# Patient Record
Sex: MALE | Race: WHITE | NOT HISPANIC OR LATINO | Employment: FULL TIME | URBAN - METROPOLITAN AREA
[De-identification: names, ages, dates, MRNs, and addresses within clinical notes are randomized per-mention and may not be internally consistent; named-entity substitution may affect disease eponyms.]

---

## 2017-01-06 ENCOUNTER — CLINICAL SUPPORT (OUTPATIENT)
Dept: INTERNAL MEDICINE | Facility: CLINIC | Age: 42
End: 2017-01-06

## 2017-01-06 ENCOUNTER — HOSPITAL ENCOUNTER (OUTPATIENT)
Dept: CARDIOLOGY | Facility: CLINIC | Age: 42
Discharge: HOME | End: 2017-01-06
Payer: COMMERCIAL

## 2017-01-06 ENCOUNTER — HOSPITAL ENCOUNTER (OUTPATIENT)
Dept: RADIOLOGY | Facility: HOSPITAL | Age: 42
Discharge: HOME | End: 2017-01-06
Attending: INTERNAL MEDICINE
Payer: COMMERCIAL

## 2017-01-06 ENCOUNTER — CLINICAL SUPPORT (OUTPATIENT)
Dept: INTERNAL MEDICINE | Facility: CLINIC | Age: 42
End: 2017-01-06
Payer: COMMERCIAL

## 2017-01-06 ENCOUNTER — OFFICE VISIT (OUTPATIENT)
Dept: INTERNAL MEDICINE | Facility: CLINIC | Age: 42
End: 2017-01-06
Payer: COMMERCIAL

## 2017-01-06 DIAGNOSIS — Z00.00 ROUTINE GENERAL MEDICAL EXAMINATION AT A HEALTH CARE FACILITY: Primary | ICD-10-CM

## 2017-01-06 DIAGNOSIS — Z00.00 ROUTINE GENERAL MEDICAL EXAMINATION AT A HEALTH CARE FACILITY: ICD-10-CM

## 2017-01-06 LAB
ALBUMIN SERPL BCP-MCNC: 3.9 G/DL
ALP SERPL-CCNC: 60 U/L
ALT SERPL W/O P-5'-P-CCNC: 21 U/L
ANION GAP SERPL CALC-SCNC: 9 MMOL/L
AST SERPL-CCNC: 20 U/L
BILIRUB SERPL-MCNC: 0.5 MG/DL
BUN SERPL-MCNC: 16 MG/DL
CALCIUM SERPL-MCNC: 8.9 MG/DL
CHLORIDE SERPL-SCNC: 108 MMOL/L
CHOLEST/HDLC SERPL: 4.2 {RATIO}
CO2 SERPL-SCNC: 24 MMOL/L
COMPLEXED PSA SERPL-MCNC: 1.4 NG/ML
CREAT SERPL-MCNC: 1.1 MG/DL
DIASTOLIC DYSFUNCTION: NO
ERYTHROCYTE [DISTWIDTH] IN BLOOD BY AUTOMATED COUNT: 12.9 %
EST. GFR  (AFRICAN AMERICAN): >60 ML/MIN/1.73 M^2
EST. GFR  (NON AFRICAN AMERICAN): >60 ML/MIN/1.73 M^2
ESTIMATED AVG GLUCOSE: 97 MG/DL
GLUCOSE SERPL-MCNC: 84 MG/DL
HBA1C MFR BLD HPLC: 5 %
HCT VFR BLD AUTO: 42.7 %
HDL/CHOLESTEROL RATIO: 23.9 %
HDLC SERPL-MCNC: 213 MG/DL
HDLC SERPL-MCNC: 51 MG/DL
HGB BLD-MCNC: 14.5 G/DL
LDLC SERPL CALC-MCNC: 136.6 MG/DL
MCH RBC QN AUTO: 30 PG
MCHC RBC AUTO-ENTMCNC: 34 %
MCV RBC AUTO: 88 FL
NONHDLC SERPL-MCNC: 162 MG/DL
PLATELET # BLD AUTO: 285 K/UL
PMV BLD AUTO: 9.3 FL
POTASSIUM SERPL-SCNC: 4.1 MMOL/L
PROT SERPL-MCNC: 7 G/DL
RBC # BLD AUTO: 4.84 M/UL
SODIUM SERPL-SCNC: 141 MMOL/L
TRIGL SERPL-MCNC: 127 MG/DL
TSH SERPL DL<=0.005 MIU/L-ACNC: 1.42 UIU/ML
WBC # BLD AUTO: 8.14 K/UL

## 2017-01-06 PROCEDURE — 84443 ASSAY THYROID STIM HORMONE: CPT

## 2017-01-06 PROCEDURE — 80053 COMPREHEN METABOLIC PANEL: CPT

## 2017-01-06 PROCEDURE — 84153 ASSAY OF PSA TOTAL: CPT

## 2017-01-06 PROCEDURE — 80061 LIPID PANEL: CPT

## 2017-01-06 PROCEDURE — 71020 XR CHEST PA AND LATERAL: CPT | Mod: 26,,, | Performed by: RADIOLOGY

## 2017-01-06 PROCEDURE — 99999 PR PBB SHADOW E&M-EST. PATIENT-LVL III: CPT | Mod: PBBFAC,,, | Performed by: INTERNAL MEDICINE

## 2017-01-06 PROCEDURE — 71020 XR CHEST PA AND LATERAL: CPT | Mod: TC

## 2017-01-06 PROCEDURE — 83036 HEMOGLOBIN GLYCOSYLATED A1C: CPT

## 2017-01-06 PROCEDURE — 99386 PREV VISIT NEW AGE 40-64: CPT | Mod: ,,, | Performed by: INTERNAL MEDICINE

## 2017-01-06 PROCEDURE — 85027 COMPLETE CBC AUTOMATED: CPT

## 2017-01-06 PROCEDURE — 93015 CV STRESS TEST SUPVJ I&R: CPT | Mod: ,,, | Performed by: INTERNAL MEDICINE

## 2017-01-06 PROCEDURE — 97802 MEDICAL NUTRITION INDIV IN: CPT | Mod: S$GLB,,, | Performed by: INTERNAL MEDICINE

## 2017-01-06 PROCEDURE — 86703 HIV-1/HIV-2 1 RESULT ANTBDY: CPT

## 2017-01-06 PROCEDURE — 97750 PHYSICAL PERFORMANCE TEST: CPT | Mod: S$GLB,,, | Performed by: INTERNAL MEDICINE

## 2017-01-06 NOTE — PROGRESS NOTES
"Nutrition Assessment  Client name:  Tor Edgar  :  1975  Age:  41 y.o.  Gender:  male    Client states:  Very pleasant [employer] employee here for his initial Executive Health physical.  Has a history of bipolar disorder and ADD, for which he takes prescription medications daily.  Also, follows up with a psychiatrist at [facility].  Has two young sons and a family history of heart disease.  Notes that his father passed away at age 65 due to his third MI.  As a result of such, he wishes to remain proactive in managing his health, specifically his heart health.  Adhered to the ketosis diet in , during which time he eliminated his intake of carbohydrates while eating mostly "fatty" meats and vegetables.  Lost >20# with his lowest weight averaging 180#.  Adds that his weight has now stabilized at ~190#.  No longer adheres to the ketosis diet as strictly and has reincorporated moderate amounts of carbohydrates.  Nevertheless, continues to select full fat dairy products, including whole milk, full fat yogurt, and full fat cheese, in addition to a variety of lean and "fatty" meats.  Enjoys sweets and desserts although tries to limit intake, replacing with healthier alternatives, such as cottage cheese and fruit.  Also, maintains an active lifestyle, alternating between jogging, biking, and strength training.  Is anxious to see results of lipid panel this morning as his cholesterol recently yakelin to 230 according to work site wellness screening he participated in this past August.               Past Medical History   Diagnosis Date    Bipolar 1 disorder        Social History    Marital status:    Employment:  [employer]  Social History   Substance Use Topics    Smoking status: Never Smoker    Smokeless tobacco: Not on file    Alcohol use Yes      Comment: 3-4 times a month        Current medications:  has a current medication list which includes the following prescription(s): bupropion, bupropion, " "cyclobenzaprine, lamotrigine, naproxen, and tadalafil.  Vitamins, minerals, and/or supplements:  MVI + Advocare amino acid supplements   Food allergies or intolerances:  NKFA     Food History  Breakfast:  Creamy swiss Laughing Cow cheese + eggs /eggs + muffin + ibarra or sausage/omelet with green peppers and cheese  Lunch:  6 inch Subway cold cut, chicken breast, or steak and cheese sandwich on wheat   Mid-afternoon Snack:  Ritz peanut butter crackers  Dinner:  Bowtie pasta + shrimp + mixed vegetables/chicken + mushroom sauce + carrots + brown and bipin rice   H.S. Snack:  Halo ice cream/almonds, walnuts, pecans/raspberries + cottage cheese    Exercise History:  20-30 minutes jogging/biking 2-3x/week + strength training/weight lifting 1-2x/week      Lab Reports   Total Cholesterol:  213    Triglycerides:  127  HDL:  51  LDL:  136.6   Glucose:  84  HbA1c:  pending  BP:  140/84     Weight History  Height:  6' 1"     Weight:  189#  BMI:  24.9  % Body Fat:  18.59%    Diagnosis  RMR (Method:  Body Garfield):  2190 kcal  Activity Factor:  1.3  KATHRINE:  2847 - 250 = 2597 kcal    Altered nutrition-related laboratory values related to excessive saturated fat intake as evidenced by TC:  213; LDL:  136.6; BP:  140/84.    Intervention    Goals:  1.  TC < 200; LDL < 130; BP < 120/80  2.  Select mostly lean meats, including chicken, turkey, fish, roast, loin and round cuts of pork and beef, beans, etc.  3.  Switch to low fat or non fat dairy products, including 1% milk, low fat or non fat yogurt, low fat cheese, etc.  4.  Maintain physical activity of 150 minutes/week or more as tolerated    Labs were not ready at time of consult yet reviewed previous results provided by patient.  Discussed heart healthy eating, including the different types of fats and food sources of each, the importance of limiting saturated fat intake, and the current AHA recommendations regarding lean meats and low fat dairy products.  Advised patient to select " mostly lean meats and low fat dairy products so as to minimize saturated fat intake.  Also, discussed the roles of each macronutrient, specifically in relation to LBM preservation, protein sparing, and fuel maintenance throughout exercise.      Handouts provided:  Meal Planning Guide  Restaurant Guide  Eat Fit Shopping List  Eat Fit Su  Fast Food Guide  Vitamin/Mineral Guide    Monitoring/Evaluation    Monitor the following:  Weight  BMI  Saturated fat intake  Lipid panel  Blood pressure    Follow Up Plan:  Follow up with client in 1-2 years

## 2017-01-06 NOTE — PROGRESS NOTES
Subjective:       Patient ID: Tor Edgar is a 41 y.o. male.    Chief Complaint: No chief complaint on file.    HPI   Mr. Edgar has reported no previous history of cardiovascular or pulmonary disease. His father did die young of a heart attack. He has reported no physical limitations to exercise. Mr. Edgar currently performs vigorous aerobic and resistance training about 2 days per week and moderate intensity 3 days. He has a goal of reaching 16% body fat by next year.     Review of Systems    Objective:      The fitness evaluation results are as follows:    D.O.S. 1/6/2017   Height (in): 73   Weight (lbs): 189   BMI: 24.34585   Body Fat (%): 18.59   Waist (cm): 86   Hip (cm): 97   WHR: 0.89   RBP (mmHg): 122/80   RHR (bpm): 52    Strength R (lbs)t: 116    Strength Lt (lbs): 98.73909   Push-up Assessment: 32   Curl-up Assessment: 39   Flexibility Testing (cm): 28   REE (kcals): 2190       Physical Exam    Assessment:      Age/Gender Stratified Assessment:     Heart Rate: Normal   Resting BP: Normal   Body Fat %: Very Good   WHR Risk Factor: Low Risk    Strength R: Average    Strength L: Average   Upper Body Endurance: Excellent   Abdominal Endurance: Average   Lower body Flexibility: Good        1. Routine general medical examination at a health care facility        Plan:    Mr. Edgar should continue with his regular aerobic activity striving for a goal of 75 minutes of vigorous intensity aerobic exercise each week. He should also continue with his resistance training to maintain muscular strength and endurance. Daily stretching should also be performed to maintain level of flexibility. Mr. Edgar should focus on increasing resistance training over the next year so that his strength does not decrease. It was explained that increased aerobic intensity and duration will help decrease fat percentage.

## 2017-01-09 LAB — HIV 1+2 AB+HIV1 P24 AG SERPL QL IA: NEGATIVE

## 2017-01-10 VITALS
HEART RATE: 70 BPM | BODY MASS INDEX: 24.88 KG/M2 | DIASTOLIC BLOOD PRESSURE: 84 MMHG | SYSTOLIC BLOOD PRESSURE: 122 MMHG | TEMPERATURE: 98 F | WEIGHT: 193.81 LBS

## 2017-01-10 NOTE — PROGRESS NOTES
Subjective:       Patient ID: Tor Edgar is a 41 y.o. male.    Chief Complaint: Executive Health    HPIPleasant gentleman from [place], [employer]  here for his Executive Health exam. Overall doing well and had no specific complaints.WE briefly discussed the importance of proper nutrition, exercise, weight management vis a vis future health issues.  I am sending copies of his studies including blood work that showed mild increase in cholesterol level at 213 with acceptable HDL fraction. Stress test showed less than 1 mm depression in the inferolateral leads at peak exercise, but not diagnostic of ischemia. He demonstrated excellent exercise capacity and was asymptomatic at peak activity. All other parameters were within normal limits including renal/liver/thyroid function, chest xray, EKG were unremarkable as well.  Review of Systems   Constitutional: Negative for activity change, appetite change, fatigue and unexpected weight change.   Eyes: Negative for visual disturbance.   Respiratory: Negative for cough and shortness of breath.    Cardiovascular: Negative for chest pain, palpitations and leg swelling.   Endocrine: Negative.    Genitourinary: Negative for difficulty urinating.   Musculoskeletal: Negative for arthralgias, back pain and joint swelling.   Neurological: Negative for dizziness, light-headedness and headaches.   Hematological: Negative.        Objective:      Physical Exam   Constitutional: He is oriented to person, place, and time. He appears well-developed and well-nourished. No distress.   HENT:   Head: Normocephalic and atraumatic.   Right Ear: External ear normal.   Left Ear: External ear normal.   Mouth/Throat: Oropharynx is clear and moist. No oropharyngeal exudate.   Eyes: Conjunctivae and EOM are normal. Pupils are equal, round, and reactive to light. Right eye exhibits no discharge. Left eye exhibits no discharge. No scleral icterus.   Neck: Normal range of motion.  Neck supple. No JVD present. No thyromegaly present.   Cardiovascular: Normal rate, regular rhythm, normal heart sounds and intact distal pulses.    No murmur heard.  Pulmonary/Chest: Effort normal and breath sounds normal. No respiratory distress. He has no wheezes. He exhibits no tenderness.   Abdominal: Soft. Bowel sounds are normal. He exhibits no distension and no mass. There is no tenderness.   Musculoskeletal: Normal range of motion. He exhibits no edema or tenderness.   Neurological: He is alert and oriented to person, place, and time. No cranial nerve deficit.   Skin: Skin is warm and dry. He is not diaphoretic.   Psychiatric: He has a normal mood and affect. His behavior is normal. Judgment and thought content normal.   Nursing note and vitals reviewed.      Assessment:       1. Routine general medical examination at a health care facility        Plan:    1. Return to clinic in 1 year or sooner if needed.

## 2017-01-18 ENCOUNTER — OFFICE VISIT (OUTPATIENT)
Dept: PSYCHIATRY | Facility: CLINIC | Age: 42
End: 2017-01-18
Payer: COMMERCIAL

## 2017-01-18 VITALS
SYSTOLIC BLOOD PRESSURE: 128 MMHG | DIASTOLIC BLOOD PRESSURE: 82 MMHG | HEART RATE: 61 BPM | WEIGHT: 190 LBS | BODY MASS INDEX: 24.38 KG/M2 | HEIGHT: 74 IN

## 2017-01-18 DIAGNOSIS — F31.81 BIPOLAR 2 DISORDER: Primary | ICD-10-CM

## 2017-01-18 PROCEDURE — 1159F MED LIST DOCD IN RCRD: CPT | Mod: S$GLB,,, | Performed by: PSYCHIATRY & NEUROLOGY

## 2017-01-18 PROCEDURE — 99214 OFFICE O/P EST MOD 30 MIN: CPT | Mod: S$GLB,,, | Performed by: PSYCHIATRY & NEUROLOGY

## 2017-01-18 PROCEDURE — 99999 PR PBB SHADOW E&M-EST. PATIENT-LVL II: CPT | Mod: PBBFAC,,, | Performed by: PSYCHIATRY & NEUROLOGY

## 2017-01-18 RX ORDER — LAMOTRIGINE 200 MG/1
200 TABLET ORAL DAILY
Qty: 30 TABLET | Refills: 1 | Status: SHIPPED | OUTPATIENT
Start: 2017-01-18 | End: 2017-02-23 | Stop reason: SDUPTHER

## 2017-01-18 RX ORDER — BUPROPION HYDROCHLORIDE 150 MG/1
TABLET ORAL
Qty: 30 TABLET | Refills: 1 | Status: SHIPPED | OUTPATIENT
Start: 2017-01-18 | End: 2017-02-23 | Stop reason: SDUPTHER

## 2017-01-18 RX ORDER — BUPROPION HYDROCHLORIDE 300 MG/1
TABLET ORAL
Qty: 30 TABLET | Refills: 1 | Status: SHIPPED | OUTPATIENT
Start: 2017-01-18 | End: 2017-02-23 | Stop reason: SDUPTHER

## 2017-01-18 NOTE — PROGRESS NOTES
1/18/2017 8:39 AM   Tor Edgar   1975   [id number]       OUTPATIENT PSYCHIATRY FOLLOW UP NOTE      Clinical Status of Patient:  Outpatient (Ambulatory)    Chief Complaint:  Tor Edgar is a 41 y.o. male who presents today for follow-up of Bipolar II d/o & ADHD.  Met with patient and I have reviewed the patient's medical history in detail and updated the computerized patient record.    Interval History and Content of Current Session:  Interim Events/Subjective Report/Content of Current Session:   At last visit 6 months ago, Dr. Lucero continued dose of wellbutrin to 450mg to target sx of ADHD and Pt continued on Lamictal 200mg daily.     Pt states he has been having stable moods overall.  He hasn't had any manic episodes but has had one day that he felt really down. Pt sleeping about 5.5 hrs during the week and 7 hrs on the weekend.  States he has a lot of thoughts (not worries) prior to bed and needs to read to calm his mind.  Admits to feeling restless.  Pt feels like he needs to keep doing things and doesn't sit still for long. Pt is motor driven even after a long day.  Pt is able to focus on reading. States he has trouble staying on task.  States he can focus well if he is well rested and not stressed. Pt states he dwells on things a lot.  Mentions lack of focus causes procrastination, then anxiety and depression. Mentions in childhood having trouble with both anxiety and inattention. States recently he has had increased anxiety over his relationship with his girlfriend that is Jewish, his work and a poor stock deal he made. Denies SI, HI, AVH.     Past medications: lithium, lamictal, wellbutrin      SUBJECTIVE     Psychiatric Review Of Systems - Is patient experiencing or having changes in:  sleep: no  appetite: no  weight: no  energy/anergy: no  interest/pleasure/anhedonia: no  somatic symptoms: no  libido: no  guilty/hopelessness: no  concentration: no  S.I.B.s/risky behavior:  no  SI/SA:  no    anxiety/panic: no  Agoraphobia:  no  Social phobia:  no  Recurrent nightmares:  no  hyper startle response:  no  Avoidance: no  Recurrent thoughts:  no  Recurrent behaviors:  no    Irritability: no  Racing thoughts: no  Impulsive behaviors: no  Pressured speech:  no    Paranoia:no  Delusions: no  AVH:no    Substance abuse:  ETOH- rare  Drugs- no  Caffeine: rarely    Past Medical, Family and Social History: The patient's past medical, family and social history have been reviewed and updated as appropriate within the electronic medical record - see encounter notes.  Pt works for [employer]. Pt has girlfriend.  Pt has 2 sons from past marriage.  2 past marriages. Pt has no other medical issues.     Current Psychotropic medications:  wellbutrin XL 450mg daily  lamictal 200mg daily    Compliance: yes    Side effects: None    Risk Parameters:  Patient reports no suicidal ideation  Patient reports no homicidal ideation  Patient reports no self-injurious behavior  Patient reports no violent behavior    Psychotherapy:  Supportive psychotherapy for 10 min    OBJECTIVE     Medical ROS  General ROS: negative for - chills, fatigue or fever  Respiratory ROS: no cough, shortness of breath, or wheezing  Cardiovascular ROS: no chest pain or dyspnea on exertion  Gastrointestinal ROS: no abdominal pain, change in bowel habits, or black or bloody stools  Musculoskeletal ROS: negative for - gait disturbance, joint stiffness, muscle pain or muscular weakness  Neurological ROS: negative for - confusion, dizziness, gait disturbance, headaches, impaired coordination/balance, seizures or visual changes    Nutritional Screening: Considering the patient's height and weight, medications, medical history and preferences, should a referral be made to the dietitian? no    Musculoskeletal  Muscle Strength/Tone:  no spasicity, no rigidity, no tremor, no tic   Gait & Station:  non-ataxic     Relevant Elements of Neurological Exam: normal  "gait  AIMS:  n/a    Constitutional  Vitals:  Most recent vital signs, dated less than 90 days prior to this appointment, were reviewed.    Vitals:    01/18/17 1518   BP: 128/82   Pulse: 61   Weight: 86.2 kg (190 lb)   Height: 6' 2" (1.88 m)          Allergies  Review of patient's allergies indicates:  No Known Allergies    Laboratory Data  Hospital Outpatient Visit on 01/06/2017   Component Date Value Ref Range Status    Diastolic Dysfunction 01/06/2017 No   Final   Clinical Support on 01/06/2017   Component Date Value Ref Range Status    Sodium 01/06/2017 141  136 - 145 mmol/L Final    Potassium 01/06/2017 4.1  3.5 - 5.1 mmol/L Final    Chloride 01/06/2017 108  95 - 110 mmol/L Final    CO2 01/06/2017 24  23 - 29 mmol/L Final    Glucose 01/06/2017 84  70 - 110 mg/dL Final    BUN, Bld 01/06/2017 16  6 - 20 mg/dL Final    Creatinine 01/06/2017 1.1  0.5 - 1.4 mg/dL Final    Calcium 01/06/2017 8.9  8.7 - 10.5 mg/dL Final    Total Protein 01/06/2017 7.0  6.0 - 8.4 g/dL Final    Albumin 01/06/2017 3.9  3.5 - 5.2 g/dL Final    Total Bilirubin 01/06/2017 0.5  0.1 - 1.0 mg/dL Final    Alkaline Phosphatase 01/06/2017 60  55 - 135 U/L Final    AST 01/06/2017 20  10 - 40 U/L Final    ALT 01/06/2017 21  10 - 44 U/L Final    Anion Gap 01/06/2017 9  8 - 16 mmol/L Final    eGFR if African American 01/06/2017 >60.0  >60 mL/min/1.73 m^2 Final    eGFR if non African American 01/06/2017 >60.0  >60 mL/min/1.73 m^2 Final    WBC 01/06/2017 8.14  3.90 - 12.70 K/uL Final    RBC 01/06/2017 4.84  4.60 - 6.20 M/uL Final    Hemoglobin 01/06/2017 14.5  14.0 - 18.0 g/dL Final    Hematocrit 01/06/2017 42.7  40.0 - 54.0 % Final    MCV 01/06/2017 88  82 - 98 fL Final    MCH 01/06/2017 30.0  27.0 - 31.0 pg Final    MCHC 01/06/2017 34.0  32.0 - 36.0 % Final    RDW 01/06/2017 12.9  11.5 - 14.5 % Final    Platelets 01/06/2017 285  150 - 350 K/uL Final    MPV 01/06/2017 9.3  9.2 - 12.9 fL Final    Cholesterol 01/06/2017 213* " 120 - 199 mg/dL Final    Triglycerides 01/06/2017 127  30 - 150 mg/dL Final    HDL 01/06/2017 51  40 - 75 mg/dL Final    LDL Cholesterol 01/06/2017 136.6  63.0 - 159.0 mg/dL Final    HDL/Chol Ratio 01/06/2017 23.9  20.0 - 50.0 % Final    Total Cholesterol/HDL Ratio 01/06/2017 4.2  2.0 - 5.0 Final    Non-HDL Cholesterol 01/06/2017 162  mg/dL Final    TSH 01/06/2017 1.416  0.400 - 4.000 uIU/mL Final    Hemoglobin A1C 01/06/2017 5.0  4.5 - 6.2 % Final    Estimated Avg Glucose 01/06/2017 97  68 - 131 mg/dL Final    HIV 1/2 Ag/Ab 01/06/2017 Negative  Negative Final    PSA, SCREEN 01/06/2017 1.4  0.00 - 4.00 ng/mL Final       All Medications  Outpatient Encounter Prescriptions as of 1/18/2017   Medication Sig Dispense Refill    CREATINE MONOHYDRATE (CREATINE ORAL) Take by mouth.      multivitamin (THERAGRAN) tablet Take 1 tablet by mouth once daily.      buPROPion (WELLBUTRIN XL) 150 MG TB24 tablet Take 150mg along with 300mg daily for a total of 450mg daily. 30 tablet 1    buPROPion (WELLBUTRIN XL) 300 MG 24 hr tablet Take 300mg along with 150mg for a total of 450mg daily 30 tablet 1    lamotrigine (LAMICTAL) 200 MG tablet Take 1 tablet (200 mg total) by mouth once daily. 30 tablet 1    [DISCONTINUED] buPROPion (WELLBUTRIN XL) 150 MG TB24 tablet Take 150mg along with 300mg daily for a total of 450mg daily. 30 tablet 5    [DISCONTINUED] buPROPion (WELLBUTRIN XL) 300 MG 24 hr tablet Take 300mg along with 150mg for a total of 450mg daily 30 tablet 5    [DISCONTINUED] cyclobenzaprine (FLEXERIL) 10 MG tablet Take 1 tablet (10 mg total) by mouth 3 (three) times daily as needed for Muscle spasms (no working or driving on this medication). 30 tablet 0    [DISCONTINUED] lamotrigine (LAMICTAL) 200 MG tablet Take 1 tablet (200 mg total) by mouth once daily. 30 tablet 5    [DISCONTINUED] naproxen (NAPROSYN) 500 MG tablet Take 1 tablet (500 mg total) by mouth 2 (two) times daily. 60 tablet 1    [DISCONTINUED]  "tadalafil (CIALIS) 20 MG Tab Take 1 tablet (20 mg total) by mouth once daily. 12 tablet 4     No facility-administered encounter medications on file as of 1/18/2017.        Psychiatric Mental Status Exam  Appearance: unremarkable, age appropriate, NAD  Behavior/Cooperation: limited/ appropriate friendly and cooperative  Speech: appropriate rate, volume and tone  Mood: "5/10"  Affect:  congruent with mood and appropriate to situation/content    Thought Process: normal and logical; linear, coherent  Thought Content: normal, no suicidality, no homicidality, delusions, or paranoia  Sensorium:    grossly intact  Alert and Oriented: x3  Memory: grossly intact; intact to recent and remote past  Attention/concentration: appropriate for age/education; intact to conversation  Abstract reasoning: grossly intact, able to abstract  Insight: Intact  Judgment: Intact    ASSESSMENT      Impression:    ICD-10-CM ICD-9-CM   1. Bipolar 2 disorder F31.81 296.89         Treatment Goals:  Specify outcomes written in observable, behavioral terms:   continued mood stability; improved attention and focus    Status/Progress: Based on the examination today, the patient's problem(s) is/are adequately but not ideally controlled.  New problems have not been presented today.   Lack of compliance are not complicating management of the primary condition.  The working differential for this patient includes ADHD vs MIKE.     TREATMENT PLAN     · Medication Management: Continue Lamictal 200mg daily & Wellbutrin XL 450mg daily  · Continue to assess ADHD vs MIKE as source of inattention  · Offered individual therapy.  · 2017 lab reviewed and WNL except for mildly elevated cholesterol  · The treatment plan and follow up plan were reviewed with the patient.  · Discussed with patient informed consent, risks vs. benefits, alternative treatments, side effect profile and the inherent unpredictability of individual responses to these treatments. The patient " expresses understanding of the above and displays the capacity to agree with this current plan.  · Encouraged Patient to keep future appointments.   · Take medications as prescribed and abstain from substance abuse.   · In the event of an emergency patient was advised to go to the emergency room.    Return to Clinic: 1 month.    Counseling/ psychotherapy time: 20  Total time: 30    Tiffanie Reddy MD Psychiatry HO-3   01/18/2017

## 2017-02-22 ENCOUNTER — TELEPHONE (OUTPATIENT)
Dept: PSYCHIATRY | Facility: CLINIC | Age: 42
End: 2017-02-22

## 2017-02-23 ENCOUNTER — OFFICE VISIT (OUTPATIENT)
Dept: PSYCHIATRY | Facility: CLINIC | Age: 42
End: 2017-02-23
Payer: COMMERCIAL

## 2017-02-23 VITALS — WEIGHT: 194 LBS | HEIGHT: 73 IN | BODY MASS INDEX: 25.71 KG/M2

## 2017-02-23 DIAGNOSIS — F31.81 BIPOLAR 2 DISORDER: Primary | ICD-10-CM

## 2017-02-23 PROCEDURE — 99213 OFFICE O/P EST LOW 20 MIN: CPT | Mod: S$GLB,,, | Performed by: PSYCHIATRY & NEUROLOGY

## 2017-02-23 PROCEDURE — 99999 PR PBB SHADOW E&M-EST. PATIENT-LVL II: CPT | Mod: PBBFAC,,, | Performed by: PSYCHIATRY & NEUROLOGY

## 2017-02-23 PROCEDURE — 1160F RVW MEDS BY RX/DR IN RCRD: CPT | Mod: S$GLB,,, | Performed by: PSYCHIATRY & NEUROLOGY

## 2017-02-23 RX ORDER — ESCITALOPRAM OXALATE 10 MG/1
10 TABLET ORAL DAILY
Qty: 30 TABLET | Refills: 1 | Status: SHIPPED | OUTPATIENT
Start: 2017-02-23 | End: 2017-05-16 | Stop reason: SDUPTHER

## 2017-02-23 RX ORDER — LAMOTRIGINE 200 MG/1
200 TABLET ORAL DAILY
Qty: 30 TABLET | Refills: 1 | Status: SHIPPED | OUTPATIENT
Start: 2017-02-23 | End: 2017-05-16 | Stop reason: SDUPTHER

## 2017-02-23 RX ORDER — BUPROPION HYDROCHLORIDE 300 MG/1
TABLET ORAL
Qty: 30 TABLET | Refills: 1 | Status: SHIPPED | OUTPATIENT
Start: 2017-02-23 | End: 2017-05-16 | Stop reason: SDUPTHER

## 2017-02-23 RX ORDER — BUPROPION HYDROCHLORIDE 150 MG/1
TABLET ORAL
Qty: 30 TABLET | Refills: 1 | Status: SHIPPED | OUTPATIENT
Start: 2017-02-23 | End: 2017-05-16 | Stop reason: SDUPTHER

## 2017-02-23 NOTE — PROGRESS NOTES
2/23/2017 8:39 AM   Tor Edgar   1975   [id number]       OUTPATIENT PSYCHIATRY FOLLOW UP NOTE      Clinical Status of Patient:  Outpatient (Ambulatory)    Chief Complaint:  Tor Edgar is a 41 y.o. male who presents today for follow-up of Bipolar II d/o & ADHD.  Met with patient and I have reviewed the patient's medical history in detail and updated the computerized patient record.    Interval History and Content of Current Session:  Interim Events/Subjective Report/Content of Current Session:   Pt states he has been doing really well.  Relationship with his girlfriend is going well.  He has been worried about his son who has been acting out a little. Pt still having trouble with focus and is unsure if the Wellbutrin is helping. Thinks problem with focus is connected to anxiety.  Pt mentions when alone his mind thinks about live events and stress.  He tried to take his mind off that by reading or watching something to help him calm down before bed.  Will have worse focus when stressed and will become irritable.  Denies trouble with energy.  Denies having panic attacks, manic or depressive episodes since last visit. Sleeping 6-7hrs a night. Denies SI, HI, AVH or medication SE.      Past medications: lithium, Lamictal, Wellbutrin      SUBJECTIVE     Psychiatric Review Of Systems - Is patient experiencing or having changes in:  sleep: no  appetite: no  weight: no  energy/anergy: no  interest/pleasure/anhedonia: no  somatic symptoms: no  libido: no  guilty/hopelessness: no  concentration: yes  S.I.B.s/risky behavior: no  SI/SA:  no    Anxiety/panic: yes  Agoraphobia:  no  Social phobia:  no  Recurrent nightmares:  no  hyper startle response:  no  Avoidance: no  Recurrent thoughts:  no  Recurrent behaviors:  no    Irritability: no  Racing thoughts: no  Impulsive behaviors: no  Pressured speech:  no    Paranoia:no  Delusions: no  AVH:no    Substance abuse:  ETOH- rare  Drugs- no  Caffeine:  "rarely    Past Medical, Family and Social History: The patient's past medical, family and social history have been reviewed and updated as appropriate within the electronic medical record - see encounter notes.  Pt works for [employer]. Pt has girlfriend whom is Religious.  He has been working on their Catholic differences.  Pt has 2 sons from past marriage.  2 past marriages. Pt has no other medical issues.     Current Psychotropic medications:  Wellbutrin XL 450mg daily  Lamictal 200mg daily    Compliance: yes    Side effects: None    Risk Parameters:  Patient reports no suicidal ideation  Patient reports no homicidal ideation  Patient reports no self-injurious behavior  Patient reports no violent behavior    Psychotherapy:  Supportive psychotherapy for 10 min    OBJECTIVE     Medical ROS  General ROS: negative for - chills, fatigue or fever  Respiratory ROS: no cough, shortness of breath, or wheezing  Cardiovascular ROS: no chest pain or dyspnea on exertion  Gastrointestinal ROS: no abdominal pain, change in bowel habits, or black or bloody stools  Musculoskeletal ROS: negative for - gait disturbance, joint stiffness, muscle pain or muscular weakness  Neurological ROS: negative for - confusion, dizziness, gait disturbance, headaches, impaired coordination/balance, seizures or visual changes    Nutritional Screening: Considering the patient's height and weight, medications, medical history and preferences, should a referral be made to the dietitian? no    Musculoskeletal  Muscle Strength/Tone:  no spasicity, no rigidity, no tremor, no tic   Gait & Station:  non-ataxic     Relevant Elements of Neurological Exam: normal gait  AIMS:  n/a    Constitutional  Vitals:  Most recent vital signs, dated less than 90 days prior to this appointment, were reviewed.    Vitals:    02/23/17 0826   Weight: 88 kg (194 lb)   Height: 6' 1" (1.854 m)          Allergies  Review of patient's allergies indicates:  No Known " "Allergies    Laboratory Data  No visits with results within 1 Month(s) from this visit.  Latest known visit with results is:    Hospital Outpatient Visit on 01/06/2017   Component Date Value Ref Range Status    Diastolic Dysfunction 01/06/2017 No   Final       All Medications  Outpatient Encounter Prescriptions as of 2/23/2017   Medication Sig Dispense Refill    buPROPion (WELLBUTRIN XL) 150 MG TB24 tablet Take 150mg along with 300mg daily for a total of 450mg daily. 30 tablet 1    buPROPion (WELLBUTRIN XL) 300 MG 24 hr tablet Take 300mg along with 150mg for a total of 450mg daily 30 tablet 1    CREATINE MONOHYDRATE (CREATINE ORAL) Take by mouth.      escitalopram oxalate (LEXAPRO) 10 MG tablet Take 1 tablet (10 mg total) by mouth once daily. 30 tablet 1    lamotrigine (LAMICTAL) 200 MG tablet Take 1 tablet (200 mg total) by mouth once daily. 30 tablet 1    multivitamin (THERAGRAN) tablet Take 1 tablet by mouth once daily.      [DISCONTINUED] buPROPion (WELLBUTRIN XL) 150 MG TB24 tablet Take 150mg along with 300mg daily for a total of 450mg daily. 30 tablet 1    [DISCONTINUED] buPROPion (WELLBUTRIN XL) 300 MG 24 hr tablet Take 300mg along with 150mg for a total of 450mg daily 30 tablet 1    [DISCONTINUED] lamotrigine (LAMICTAL) 200 MG tablet Take 1 tablet (200 mg total) by mouth once daily. 30 tablet 1     No facility-administered encounter medications on file as of 2/23/2017.        Psychiatric Mental Status Exam  Appearance: unremarkable, age appropriate, NAD  Behavior/Cooperation: limited/ appropriate friendly and cooperative  Speech: appropriate rate, volume and tone  Mood: "7-8/10"  Affect:  congruent with mood and appropriate to situation/content    Thought Process: normal and logical; linear, coherent  Thought Content: normal, no suicidality, no homicidality, delusions, or paranoia  Sensorium:    grossly intact  Alert and Oriented: x3  Memory: grossly intact; intact to recent and remote " past  Attention/concentration: appropriate for age/education; intact to conversation  Abstract reasoning: grossly intact, able to abstract  Insight: Intact  Judgment: Intact    ASSESSMENT      Impression:    ICD-10-CM ICD-9-CM   1. Bipolar 2 disorder F31.81 296.89   MIKE vs ADHD, likely MIKE given how anxiety influences his focus    Status/Progress: Based on the examination today, the patient's problem(s) is/are adequately but not ideally controlled.  New problems have not been presented today.   Lack of compliance are not complicating management of the primary condition.  The working differential for this patient includes ADHD vs MIKE.     TREATMENT PLAN     · Medication Management: Continue Lamictal 200mg daily & Wellbutrin XL 450mg daily  · Start lexapro 10mg PO daily for MIKE sx with goal to improve focus  · Needs refills at next appointment  · Offered individual therapy, Pt declines at this time  · 2017 lab reviewed and WNL except for mildly elevated cholesterol  · The treatment plan and follow up plan were reviewed with the patient.  · Discussed with patient informed consent, risks vs. benefits, alternative treatments, side effect profile and the inherent unpredictability of individual responses to these treatments. The patient expresses understanding of the above and displays the capacity to agree with this current plan.  · Encouraged Patient to keep future appointments.   · Take medications as prescribed and abstain from substance abuse.   · In the event of an emergency patient was advised to go to the emergency room.    Return to Clinic: 6 weeks    Counseling/ psychotherapy time: 10  Total time: 30    Tiffanie Reddy MD Psychiatry HO-3   02/23/2017

## 2017-03-31 ENCOUNTER — OFFICE VISIT (OUTPATIENT)
Dept: INTERNAL MEDICINE | Facility: CLINIC | Age: 42
End: 2017-03-31
Payer: COMMERCIAL

## 2017-03-31 VITALS
DIASTOLIC BLOOD PRESSURE: 74 MMHG | BODY MASS INDEX: 26.12 KG/M2 | SYSTOLIC BLOOD PRESSURE: 136 MMHG | WEIGHT: 197.06 LBS | TEMPERATURE: 98 F | RESPIRATION RATE: 16 BRPM | HEIGHT: 73 IN | HEART RATE: 58 BPM

## 2017-03-31 DIAGNOSIS — R53.83 FATIGUE, UNSPECIFIED TYPE: Primary | ICD-10-CM

## 2017-03-31 PROCEDURE — 99213 OFFICE O/P EST LOW 20 MIN: CPT | Mod: S$GLB,,, | Performed by: INTERNAL MEDICINE

## 2017-03-31 PROCEDURE — 99999 PR PBB SHADOW E&M-EST. PATIENT-LVL III: CPT | Mod: PBBFAC,,, | Performed by: INTERNAL MEDICINE

## 2017-03-31 PROCEDURE — 1160F RVW MEDS BY RX/DR IN RCRD: CPT | Mod: S$GLB,,, | Performed by: INTERNAL MEDICINE

## 2017-03-31 NOTE — PROGRESS NOTES
Subjective:       Patient ID: Tor Edgar is a 41 y.o. male.    Chief Complaint: Headache (intermittent); Fatigue; and Herpes Zoster (bottom left lip )    HPI     41-year-old male here for evaluation of a headache, fatigue, and herpes zoster.  He has been having a weird week.  Monday afternoon, he had a headache and hazy feeling.  He went home and relaxed a little bit.  He took some excedrin.  He still had the headache and felt flushed throughout his body.  He has had a poor appetite.  He felt a lot better this am.  He has a cold sore since Tuesday.  He put blistex on it.  This is resolving.  He felt hot.  No chills.  No aches and felt lethargic.  He had a sharp pain in his back area- checked out by [employer] nurse.  Resolved a couple hours later.  Frontal/temporal headache.  No sinus pressure, congestion coughing, sore throat.  No hematuria.  He has had some hemorrhoids about a week ago.  No nausea, vomiting, diarrhea.  He was not hungry at all.    Review of Systems    Objective:      Physical Exam   Constitutional: He is oriented to person, place, and time. He appears well-developed and well-nourished.   HENT:   Head: Normocephalic and atraumatic.   Mouth/Throat: No oropharyngeal exudate.   Eyes: EOM are normal. Pupils are equal, round, and reactive to light. Right eye exhibits no discharge. Left eye exhibits no discharge. No scleral icterus.   Neck: Normal range of motion. Neck supple. No tracheal deviation present. No thyromegaly present.   Cardiovascular: Normal rate, regular rhythm and normal heart sounds.  Exam reveals no gallop and no friction rub.    No murmur heard.  Pulmonary/Chest: Effort normal and breath sounds normal. No respiratory distress. He has no wheezes. He has no rales. He exhibits no tenderness.   Abdominal: Soft. Bowel sounds are normal. He exhibits no distension and no mass. There is no tenderness. There is no rebound and no guarding.   Musculoskeletal: Normal range of motion. He  exhibits no edema or tenderness.   Neurological: He is alert and oriented to person, place, and time.   Skin: Skin is warm and dry. No rash noted. No erythema. No pallor.   Psychiatric: He has a normal mood and affect. His behavior is normal.   Vitals reviewed.      Assessment:       1. Fatigue, unspecified type        Plan:       1.  Likely viral infection.  This seems to have reference course.  No need for further treatment.  Patient advised to remain hydrated.

## 2017-04-22 ENCOUNTER — HOSPITAL ENCOUNTER (EMERGENCY)
Facility: HOSPITAL | Age: 42
Discharge: HOME | End: 2017-04-22
Attending: EMERGENCY MEDICINE | Admitting: EMERGENCY MEDICINE
Payer: COMMERCIAL

## 2017-04-22 VITALS
RESPIRATION RATE: 16 BRPM | HEIGHT: 74 IN | TEMPERATURE: 98 F | OXYGEN SATURATION: 100 % | DIASTOLIC BLOOD PRESSURE: 91 MMHG | SYSTOLIC BLOOD PRESSURE: 145 MMHG | BODY MASS INDEX: 24.38 KG/M2 | WEIGHT: 190 LBS | HEART RATE: 48 BPM
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
## 2017-04-22 DIAGNOSIS — M25.519 SHOULDER PAIN: Primary | ICD-10-CM

## 2017-04-22 PROCEDURE — 99284 EMERGENCY DEPT VISIT MOD MDM: CPT | Mod: ,,, | Performed by: PHYSICIAN ASSISTANT

## 2017-04-22 PROCEDURE — 99283 EMERGENCY DEPT VISIT LOW MDM: CPT

## 2017-04-22 PROCEDURE — 25000003 PHARM REV CODE 250: Performed by: PHYSICIAN ASSISTANT

## 2017-04-22 RX ORDER — METHOCARBAMOL 500 MG/1
1000 TABLET, FILM COATED ORAL 3 TIMES DAILY PRN
Qty: 30 TABLET | Refills: 0 | Status: SHIPPED | OUTPATIENT
Start: 2017-04-22 | End: 2017-04-27

## 2017-04-22 RX ORDER — NAPROXEN 500 MG/1
500 TABLET ORAL 2 TIMES DAILY WITH MEALS
Qty: 20 TABLET | Refills: 0 | Status: SHIPPED | OUTPATIENT
Start: 2017-04-22

## 2017-04-22 RX ORDER — ACETAMINOPHEN 325 MG/1
650 TABLET ORAL
Status: COMPLETED | OUTPATIENT
Start: 2017-04-22 | End: 2017-04-22

## 2017-04-22 RX ADMIN — ACETAMINOPHEN 650 MG: 325 TABLET ORAL at 10:04
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
## 2017-04-23 NOTE — DISCHARGE INSTRUCTIONS
Follow up with your PCP and orthopedics. Take the pain medication (naproxen) twice a day. Do not take any additional NSAIDS (ibuprofen, advil, aleve, aspirin, Goody powder, etc) while on this medication.  Take the muscle relaxer (robaxin) three times a day as needed. Return to the ED for any new or worsening symptoms, including those listed below.        Shoulder Contusion  You have a shoulder injury called a contusion. This causes pain, swelling, and sometimes bruising on the skin. You dont have any broken bones. This injury will take from a few days to several weeks to heal, depending on how severe it is. Moderate to severe shoulder contusions are treated with a sling or shoulder immobilizer. Minor contusions can be treated without any special support.  Home care  Follow these tips when caring for yourself at home:  · If you were given a sling to use, leave it in place for the time advised by your healthcare provider. If you arent sure how long to wear it, ask for advice. If the sling becomes loose, adjust it so that your forearm is level with the ground. Your shoulder should feel well supported.  · Put an ice pack on the injured area for 20 minutes every 1 to 2 hours the first day. You can make your own ice pack by putting ice cubes in a plastic bag. Wrap the bag in a thin towel. Continue with ice packs 3 to 4 times a day for the next 2 days. Then use the pack as needed to ease pain and swelling.  · You may use acetaminophen or ibuprofen to control pain, unless another pain medicine was prescribed. If you have chronic liver or kidney disease, talk with your healthcare provider before using these medicines. Also talk with your provider if youve ever had a stomach ulcer or GI bleeding.  · Shoulder and elbow joints become stiff if left in a sling for too long. You should start range of motion exercises about 7 to 10 days after the injury. Talk with your provider to find out what type of exercises to do and how  soon to start.  · Unless your provider told you otherwise, you can take the sling off to shower or bathe.  Follow-up care  Follow up with your healthcare provider if you dont start getting better in the next 5 days.  When to seek medical advice  Call your healthcare provider right away if any of these occur:  · Pain or swelling gets worse or continues for more than a few days  · Large amount of bruising on your shoulder or upper arm  · Your hand or fingers become cold, blue, numb, or tingly  · Difficulty moving your hand or fingers  · Weakness in your hand or fingers  · Your shoulder becomes stiff  · Your shoulder feels like it is popping out  · You arent able to do your daily activities  Date Last Reviewed: 10/1/2016  © 2618-3354 [employer]. [address] [place] [address], [place] [address]. All rights reserved. This information is not intended as a substitute for professional medical care. Always follow your healthcare professional's instructions.          Shoulder Sprain  A sprain is a stretching or tearing of the ligaments that hold a joint together. A sprain may take up to 8 weeks to fully heal, depending on how severe it is. Moderate to severe shoulder sprains are treated with a sling or shoulder immobilizer. Minor sprains can be treated without any special support.  Home care  The following guidelines will help you care for your injury at home:  · If a sling was given to you, leave it in place for the time advised by your healthcare provider. If you arent sure how long to wear it, ask for advice. If the sling becomes loose, adjust it so that your forearm is level with the ground. Your shoulder should feel well supported.  · Put an ice pack on the injured area for 20 minutes every 1 to 2 hours the first day. You can make your own ice pack by putting ice cubes in a plastic bag. A bag of frozen peas or something similar works well too. Wrap the bag in a thin towel. Continue with ice packs 3 to 4 times  a day for the next 2 to 3 days. Then use the pack as needed to ease pain and swelling.  · You may use acetaminophen or ibuprofen to control pain, unless another pain medicine was prescribed. If you have chronic liver or kidney disease, talk with your healthcare provider before using these medicines. Also talk with your provider if youve had a stomach ulcer or gastrointestinal bleeding.  · Shoulder joints become stiff if left in a sling for too long. You should start range of motion exercises about 7 to 10 days after the injury. Talk with your provider to find out what type of exercises to do and how soon to start.  Follow-up care  Follow up with your healthcare provider, or as advised.  Any X-rays you had today dont show any broken bones, breaks, or fractures. Sometimes fractures dont show up on the first X-ray. Bruises and sprains can sometimes hurt as much as a fracture. These injuries can take time to heal completely. If your symptoms dont improve or they get worse, talk with your provider. You may need a repeat X-ray or other treatments.  When to seek medical advice  Call your healthcare provider right away if any of these occur:  · Shoulder pain or swelling in your arm that gets worse  · Fingers become cold, blue, numb, or tingly  · Large amount of bruising of the shoulder or upper arm  · Fever or chills  Date Last Reviewed: 8/1/2016  © 9271-6864 [employer]. [address] [place] [address], [place] [address]. All rights reserved. This information is not intended as a substitute for professional medical care. Always follow your healthcare professional's instructions.

## 2017-05-15 ENCOUNTER — PATIENT MESSAGE (OUTPATIENT)
Dept: PSYCHIATRY | Facility: CLINIC | Age: 42
End: 2017-05-15

## 2017-05-16 RX ORDER — ESCITALOPRAM OXALATE 10 MG/1
10 TABLET ORAL DAILY
Qty: 30 TABLET | Refills: 0 | Status: SHIPPED | OUTPATIENT
Start: 2017-05-16 | End: 2017-08-22 | Stop reason: SDUPTHER

## 2017-05-16 RX ORDER — LAMOTRIGINE 200 MG/1
200 TABLET ORAL DAILY
Qty: 30 TABLET | Refills: 0 | Status: SHIPPED | OUTPATIENT
Start: 2017-05-16 | End: 2017-08-22 | Stop reason: SDUPTHER

## 2017-05-16 RX ORDER — BUPROPION HYDROCHLORIDE 300 MG/1
TABLET ORAL
Qty: 30 TABLET | Refills: 0 | Status: SHIPPED | OUTPATIENT
Start: 2017-05-16 | End: 2017-08-22 | Stop reason: SDUPTHER

## 2017-05-16 RX ORDER — BUPROPION HYDROCHLORIDE 150 MG/1
TABLET ORAL
Qty: 30 TABLET | Refills: 0 | Status: SHIPPED | OUTPATIENT
Start: 2017-05-16 | End: 2017-10-18 | Stop reason: SDUPTHER

## 2017-08-22 ENCOUNTER — OFFICE VISIT (OUTPATIENT)
Dept: PSYCHIATRY | Facility: CLINIC | Age: 42
End: 2017-08-22
Payer: COMMERCIAL

## 2017-08-22 VITALS
HEART RATE: 56 BPM | DIASTOLIC BLOOD PRESSURE: 83 MMHG | BODY MASS INDEX: 24.77 KG/M2 | HEIGHT: 74 IN | SYSTOLIC BLOOD PRESSURE: 153 MMHG | WEIGHT: 193 LBS

## 2017-08-22 DIAGNOSIS — F41.1 GENERALIZED ANXIETY DISORDER: ICD-10-CM

## 2017-08-22 DIAGNOSIS — F31.81 BIPOLAR 2 DISORDER: ICD-10-CM

## 2017-08-22 DIAGNOSIS — F90.2 ATTENTION DEFICIT HYPERACTIVITY DISORDER (ADHD), COMBINED TYPE: Primary | ICD-10-CM

## 2017-08-22 PROCEDURE — 3008F BODY MASS INDEX DOCD: CPT | Mod: S$GLB,,, | Performed by: STUDENT IN AN ORGANIZED HEALTH CARE EDUCATION/TRAINING PROGRAM

## 2017-08-22 PROCEDURE — 99213 OFFICE O/P EST LOW 20 MIN: CPT | Mod: S$GLB,,, | Performed by: STUDENT IN AN ORGANIZED HEALTH CARE EDUCATION/TRAINING PROGRAM

## 2017-08-22 PROCEDURE — 99999 PR PBB SHADOW E&M-EST. PATIENT-LVL II: CPT | Mod: PBBFAC,,, | Performed by: STUDENT IN AN ORGANIZED HEALTH CARE EDUCATION/TRAINING PROGRAM

## 2017-08-22 RX ORDER — ESCITALOPRAM OXALATE 10 MG/1
10 TABLET ORAL DAILY
Qty: 30 TABLET | Refills: 2 | Status: SHIPPED | OUTPATIENT
Start: 2017-08-22 | End: 2017-10-18 | Stop reason: SDUPTHER

## 2017-08-22 RX ORDER — BUPROPION HYDROCHLORIDE 300 MG/1
TABLET ORAL
Qty: 30 TABLET | Refills: 2 | Status: SHIPPED | OUTPATIENT
Start: 2017-08-22 | End: 2017-10-18

## 2017-08-22 RX ORDER — LAMOTRIGINE 200 MG/1
200 TABLET ORAL DAILY
Qty: 30 TABLET | Refills: 2 | Status: SHIPPED | OUTPATIENT
Start: 2017-08-22 | End: 2017-10-18 | Stop reason: SDUPTHER
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
## 2017-10-18 ENCOUNTER — OFFICE VISIT (OUTPATIENT)
Dept: PSYCHIATRY | Facility: CLINIC | Age: 42
End: 2017-10-18
Payer: COMMERCIAL

## 2017-10-18 VITALS
WEIGHT: 205.38 LBS | HEIGHT: 74 IN | BODY MASS INDEX: 26.36 KG/M2 | DIASTOLIC BLOOD PRESSURE: 77 MMHG | HEART RATE: 49 BPM | SYSTOLIC BLOOD PRESSURE: 132 MMHG

## 2017-10-18 DIAGNOSIS — F31.81 BIPOLAR 2 DISORDER: Primary | ICD-10-CM

## 2017-10-18 DIAGNOSIS — F90.2 ATTENTION DEFICIT HYPERACTIVITY DISORDER (ADHD), COMBINED TYPE: ICD-10-CM

## 2017-10-18 DIAGNOSIS — F41.1 GENERALIZED ANXIETY DISORDER: ICD-10-CM

## 2017-10-18 PROCEDURE — 99999 PR PBB SHADOW E&M-EST. PATIENT-LVL II: CPT | Mod: PBBFAC,,, | Performed by: STUDENT IN AN ORGANIZED HEALTH CARE EDUCATION/TRAINING PROGRAM

## 2017-10-18 PROCEDURE — 99213 OFFICE O/P EST LOW 20 MIN: CPT | Mod: S$GLB,,, | Performed by: STUDENT IN AN ORGANIZED HEALTH CARE EDUCATION/TRAINING PROGRAM

## 2017-10-18 RX ORDER — BUPROPION HYDROCHLORIDE 150 MG/1
150 TABLET ORAL DAILY
Qty: 30 TABLET | Refills: 0 | Status: SHIPPED | OUTPATIENT
Start: 2017-10-18 | End: 2018-01-09

## 2017-10-18 RX ORDER — ESCITALOPRAM OXALATE 10 MG/1
10 TABLET ORAL DAILY
Qty: 30 TABLET | Refills: 3 | Status: SHIPPED | OUTPATIENT
Start: 2017-10-18 | End: 2018-01-09 | Stop reason: SDUPTHER

## 2017-10-18 RX ORDER — LAMOTRIGINE 200 MG/1
200 TABLET ORAL DAILY
Qty: 30 TABLET | Refills: 3 | Status: SHIPPED | OUTPATIENT
Start: 2017-10-18 | End: 2018-01-09 | Stop reason: SDUPTHER

## 2017-10-18 NOTE — PROGRESS NOTES
10/18/2017   Tor Edgar   1975   [id number]       OUTPATIENT PSYCHIATRY FOLLOW UP NOTE      Clinical Status of Patient:  Outpatient (Ambulatory)    Chief Complaint:  Tor Edgar is a 41 y.o. male who presents today for follow-up of Bipolar II d/o & ADHD.  Met with patient and I have reviewed the patient's medical history in detail and updated the computerized patient record.      Lamictal, wellbutrin was started for attention but didn't help much.  Interested in dropping again.  Has had improved focus with decreased anxiety on the lexapro.        Interval History and Content of Current Session:  Interim Events/Subjective Report/Content of Current Session:   Pt pleasant and friendly.  Current Meds:  Lexapro 10mg daily for anxiety, Wellbutrin XL 300mg daily for depression, Lamictal 200mg daily for mood.  Pt reported things are going well.  He decreased the wellbutrin from 450 -> 300mg without any issues.  Still feels he does not need/want the wellbutrin.  Really feels the lexapro has been helpful for his anxiety; however, noted sexual side effects.  His GF primarily complaining about the side effects.  Feels much more calm on the lexapro.  Discussed hx/reasons for starting each med in the past.  Pt noted that he was on lamictal first, which has been great for stabilizing his bipolar disorder.  He was started on wellbutrin for his issues with attention; however, never felt it helped with attention.  Did NOT start for depression.  Does not recall if the wellbutrin increased anxiety or not.  He was started on lexapro, which has helped his anxiety significantly, but has sexual AEs.  He has noticed that his attention has improved with improved control of his anxiety.  Pt happy with plan to continue to decrease and eventually discontinue wellbutrin if tolerated.  Also, discussed possibly dropping lexapro to 5mg to see if it still controls anxiety without sexual SEs.  Full psych ROS below, no SI/HI/AVH.         Per Chart Review:  We spent some time discussing his difficulty with attention.  Noted he's had trouble with attention his entire life, even in childhood especially notable at school.  His mind would wander to various random things.  Denied feeling anxious as a kid.  Continues to this day, his mind drifts off while others are talking to various topics, not necessarily anxious topics.  He's learned to cope with this and had never taken a stimulant for ADHD sxs.  He tends to be high energy at baseline.  He has trouble winding down to go to sleep nightly, but once he does, he sleeps well.  Has never tried melatonin or any sleep aid.  Discussed melatonin as an option.       Past medications: lithium, Lamictal, Wellbutrin        SUBJECTIVE     Psychiatric Review Of Systems - Is patient experiencing or having changes in:  sleep:  ~7 hrs/night  Mood:  Good, 7-8/10  appetite: no  weight: no,  energy/anergy: no  interest/pleasure/anhedonia: no  guilty/hopelessness: no  concentration: better  S.I.B.s/risky behavior: no  SI/SA:  no  Anxiety/panic: improved on lexapro  Irritability: improved  Racing thoughts: no  Impulsive behaviors: no  Pressured speech:  no  Paranoia:no  AVH:no    Substance abuse:  ETOH- rare  Drugs- no  Caffeine: rarely    Past Medical, Family and Social History: The patient's past medical, family and social history have been reviewed and updated as appropriate within the electronic medical record - see encounter notes.  Pt works for [employer]. Pt has girlfriend whom is [employer].  He has been working on their Pentecostalism differences.  Pt has 2 sons from past marriage.  2 past marriages. Pt has no other medical issues.     Current Psychotropic medications:  Wellbutrin XL 300mg daily  Lamictal 200mg daily  Lexapro 10mg daily    Compliance: yes    Side effects: None    Risk Parameters:  Patient reports no suicidal ideation  Patient reports no homicidal ideation  Patient reports no self-injurious  "behavior  Patient reports no violent behavior        OBJECTIVE     Medical ROS  General ROS: negative for - chills, fatigue or fever  Respiratory ROS: no cough, shortness of breath, or wheezing  Cardiovascular ROS: no chest pain or dyspnea on exertion  Gastrointestinal ROS: no abdominal pain, change in bowel habits, or black or bloody stools  Musculoskeletal ROS: negative for - gait disturbance, joint stiffness, muscle pain or muscular weakness  Neurological ROS: negative for - confusion, dizziness, gait disturbance, headaches, impaired coordination/balance, seizures or visual changes    Nutritional Screening: Considering the patient's height and weight, medications, medical history and preferences, should a referral be made to the dietitian? no    Musculoskeletal  Muscle Strength/Tone:  no spasicity, no rigidity, no tremor, no tic   Gait & Station:  non-ataxic     Relevant Elements of Neurological Exam: normal gait  AIMS:  n/a    Constitutional  Vitals:  Most recent vital signs, dated less than 90 days prior to this appointment, were reviewed.    Vitals:    10/18/17 0910   BP: 132/77   Pulse: (!) 49   Weight: 93.2 kg (205 lb 6.4 oz)   Height: 6' 2" (1.88 m)          Allergies  Review of patient's allergies indicates:  No Known Allergies    Laboratory Data  No visits with results within 1 Month(s) from this visit.   Latest known visit with results is:   Hospital Outpatient Visit on 01/06/2017   Component Date Value Ref Range Status    Diastolic Dysfunction 01/06/2017 No   Final       All Medications  Outpatient Encounter Prescriptions as of 10/18/2017   Medication Sig Dispense Refill    buPROPion (WELLBUTRIN XL) 150 MG TB24 tablet Take 150mg along with 300mg daily for a total of 450mg daily. 30 tablet 0    buPROPion (WELLBUTRIN XL) 300 MG 24 hr tablet Take 300mg along with 150mg for a total of 450mg daily 30 tablet 2    CREATINE MONOHYDRATE (CREATINE ORAL) Take by mouth.      escitalopram oxalate (LEXAPRO) 10 " "MG tablet Take 1 tablet (10 mg total) by mouth once daily. 30 tablet 2    lamotrigine (LAMICTAL) 200 MG tablet Take 1 tablet (200 mg total) by mouth once daily. 30 tablet 2    multivitamin (THERAGRAN) tablet Take 1 tablet by mouth once daily.      naproxen (NAPROSYN) 500 MG tablet Take 1 tablet (500 mg total) by mouth 2 (two) times daily with meals. 20 tablet 0     No facility-administered encounter medications on file as of 10/18/2017.        Psychiatric Mental Status Exam  Appearance: unremarkable, age appropriate, NAD  Behavior/Cooperation:  appropriate friendly and cooperative  Speech: conversational rate, volume and tone  Mood: "7-8/10"  Affect:  congruent with mood and appropriate to situation/content    Thought Process: logical; linear, coherent  Thought Content:  no suicidality, no homicidality, delusions, or paranoia  Sensorium:    grossly intact  Alert and Oriented: x3  Memory: grossly intact; intact to recent and remote past  Attention/concentration: appropriate for age/education; intact to conversation  Abstract reasoning: grossly intact, able to abstract  Insight: Intact  Judgment: Intact    ASSESSMENT      Impression:    ICD-10-CM ICD-9-CM   1. Bipolar 2 disorder F31.81 296.89   2. Attention deficit hyperactivity disorder (ADHD), combined type F90.2 314.01   3. Generalized anxiety disorder F41.1 300.02       Status/Progress: Based on the examination today, the patient's problem(s) is/are resolving.  New problems have not been presented today.   Lack of compliance are not complicating management of the primary condition.  The working differential for this patient includes ADHD vs MIKE.     TREATMENT PLAN     Medication:   - Continue Lamictal 200mg daily  - Decrease Wellbutrin XL 300mg -> 150mg daily, can proceed to discontinue if no issues arise (tolerated decrease from 450 to 300 with no adverse effects)  - Continue Lexapro 10mg daily for MIKE, may go down to 5mg after decreases wellbutrin if anxiety " improves    - Recommended trying benadryl if needed for anxiety    - Transition: pt moving in December and will try to come in December before moving    - Offered individual therapy, Pt declines at this time  - 2017 lab reviewed and WNL except for mildly elevated cholesterol      The treatment plan and follow up plan were reviewed with the patient.  Discussed with patient informed consent, risks vs. benefits, alternative treatments, side effect profile and the inherent unpredictability of individual responses to these treatments. The patient expresses understanding of the above and displays the capacity to agree with this current plan.  Encouraged Patient to keep future appointments.   Take medications as prescribed and abstain from substance abuse.   In the event of an emergency patient was advised to go to the emergency room.      Return to Clinic: 2-3 months        Gay Murphy MD  Psychiatry PGY-3  [phone]

## 2018-01-09 ENCOUNTER — OFFICE VISIT (OUTPATIENT)
Dept: PSYCHIATRY | Facility: CLINIC | Age: 43
End: 2018-01-09
Payer: COMMERCIAL

## 2018-01-09 DIAGNOSIS — F90.2 ATTENTION DEFICIT HYPERACTIVITY DISORDER (ADHD), COMBINED TYPE: ICD-10-CM

## 2018-01-09 DIAGNOSIS — F31.81 BIPOLAR 2 DISORDER: Primary | ICD-10-CM

## 2018-01-09 DIAGNOSIS — F41.1 GENERALIZED ANXIETY DISORDER: ICD-10-CM

## 2018-01-09 PROCEDURE — 99999 PR PBB SHADOW E&M-EST. PATIENT-LVL I: CPT | Mod: PBBFAC,,, | Performed by: STUDENT IN AN ORGANIZED HEALTH CARE EDUCATION/TRAINING PROGRAM

## 2018-01-09 PROCEDURE — 99213 OFFICE O/P EST LOW 20 MIN: CPT | Mod: S$GLB,,, | Performed by: STUDENT IN AN ORGANIZED HEALTH CARE EDUCATION/TRAINING PROGRAM

## 2018-01-09 RX ORDER — LAMOTRIGINE 200 MG/1
200 TABLET ORAL DAILY
Qty: 30 TABLET | Refills: 5 | Status: SHIPPED | OUTPATIENT
Start: 2018-01-09 | End: 2018-05-18 | Stop reason: SDUPTHER

## 2018-01-09 RX ORDER — ESCITALOPRAM OXALATE 10 MG/1
10 TABLET ORAL DAILY
Qty: 30 TABLET | Refills: 5 | Status: SHIPPED | OUTPATIENT
Start: 2018-01-09 | End: 2018-05-18 | Stop reason: SDUPTHER

## 2018-01-09 NOTE — PROGRESS NOTES
1/9/2018   Tor Edgar   1975   [id number]       OUTPATIENT PSYCHIATRY FOLLOW UP NOTE      Clinical Status of Patient:  Outpatient (Ambulatory)    Chief Complaint:  Tor Edgar is a 42 y.o. male who presents today for follow-up of Bipolar II d/o & ADHD.  Met with patient and I have reviewed the patient's medical history in detail and updated the computerized patient record.      At our last appt, we discussed going down and eventually getting off the wellbutrin b/c he's never felt benefit with this med.  Also, discussed possibly dropping lexapro to 5mg to see if it still controls anxiety without sexual SEs.       Current Psychotropic medications:  Lamictal 200mg daily  Lexapro 10mg daily    Interval History and Content of Current Session:  Interim Events/Subjective Report/Content of Current Session:   Pt pleasant and friendly; smiling, reactive.  Reported all is well.  Injured his shoulder in skiing accident, but things are going well otherwise.  He is now off the wellbutrin completely, no issues getting off this med, feels his anxiety may have improved somewhat since getting off wellbutrin.  Has not gone down on lexapro.  Noted he's happy with his current med combination.  He's in the process of moving to [place], already has apartment.  Discussed options for finding new psychiatrist there such as going to [facility] or checking on insurance website.  Discussed providing refills to his new [place] pharmacy as he has been stable on these meds for some time.      Full psych ROS below, no SI/HI/AVH.        Per Chart Review:  We spent some time discussing his difficulty with attention.  Noted he's had trouble with attention his entire life, even in childhood especially notable at school.  His mind would wander to various random things.  Denied feeling anxious as a kid.  Continues to this day, his mind drifts off while others are talking to various topics, not necessarily anxious topics.   He's learned to cope with this and had never taken a stimulant for ADHD sxs.  He tends to be high energy at baseline.  He has trouble winding down to go to sleep nightly, but once he does, he sleeps well.  Has never tried melatonin or any sleep aid.  Discussed melatonin as an option.       Past medications: Lithium, Lamictal, Wellbutrin      SUBJECTIVE     Psychiatric Review Of Systems - Is patient experiencing or having changes in:  sleep:  Good, mostly ~7 hrs/night  Mood:  Good, 8-9/10 w/10 best ever  appetite: no  weight: no  energy/anergy: no  interest/pleasure/anhedonia: no  guilty/hopelessness: no  concentration: yes, better  S.I.B.s/risky behavior: no  SI/SA:  no  Anxiety/panic: improved on lexapro  Irritability: improved  Racing thoughts: no  Impulsive behaviors: no  Pressured speech:  no  Paranoia:no  AVH:no    Substance abuse:  ETOH- rare  Drugs- no  Caffeine: rarely    Past Medical, Family and Social History: The patient's past medical, family and social history have been reviewed and updated as appropriate within the electronic medical record - see encounter notes.  Pt works for [employer]. Pt has girlfriend whom is Bahai.  Has been working on their Sabianist differences.  Pt has 2 sons from past marriage.  2 past marriages. Pt has no other medical issues.     Compliance: yes    Side effects: None    Risk Parameters:  Patient reports no suicidal ideation  Patient reports no homicidal ideation  Patient reports no self-injurious behavior  Patient reports no violent behavior        OBJECTIVE     Medical ROS  General ROS: negative for - chills, fatigue or fever  Respiratory ROS: no cough, shortness of breath, or wheezing  Cardiovascular ROS: no chest pain or dyspnea on exertion  Gastrointestinal ROS: no abdominal pain, change in bowel habits, or black or bloody stools  Musculoskeletal ROS: negative for - gait disturbance, joint stiffness, muscle pain or muscular weakness  Neurological ROS: negative for -  confusion, dizziness, gait disturbance, headaches, impaired coordination/balance, seizures or visual changes    Nutritional Screening: Considering the patient's height and weight, medications, medical history and preferences, should a referral be made to the dietitian? no    Musculoskeletal  Muscle Strength/Tone:  no spasicity, no rigidity, no tremor, no tic   Gait & Station:  non-ataxic     Relevant Elements of Neurological Exam: normal gait  AIMS:  n/a    Constitutional  Vitals:  Most recent vital signs, dated less than 90 days prior to this appointment, were reviewed.    There were no vitals filed for this visit.       Allergies  Review of patient's allergies indicates:  No Known Allergies    Laboratory Data  No visits with results within 1 Month(s) from this visit.   Latest known visit with results is:   Hospital Outpatient Visit on 01/06/2017   Component Date Value Ref Range Status    Diastolic Dysfunction 01/06/2017 No   Final       All Medications  Outpatient Encounter Prescriptions as of 1/9/2018   Medication Sig Dispense Refill    CREATINE MONOHYDRATE (CREATINE ORAL) Take by mouth.      escitalopram oxalate (LEXAPRO) 10 MG tablet Take 1 tablet (10 mg total) by mouth once daily. 30 tablet 5    lamoTRIgine (LAMICTAL) 200 MG tablet Take 1 tablet (200 mg total) by mouth once daily. 30 tablet 5    multivitamin (THERAGRAN) tablet Take 1 tablet by mouth once daily.      naproxen (NAPROSYN) 500 MG tablet Take 1 tablet (500 mg total) by mouth 2 (two) times daily with meals. 20 tablet 0    [DISCONTINUED] buPROPion (WELLBUTRIN XL) 150 MG TB24 tablet Take 1 tablet (150 mg total) by mouth once daily. 30 tablet 0    [DISCONTINUED] escitalopram oxalate (LEXAPRO) 10 MG tablet Take 1 tablet (10 mg total) by mouth once daily. 30 tablet 3    [DISCONTINUED] lamotrigine (LAMICTAL) 200 MG tablet Take 1 tablet (200 mg total) by mouth once daily. 30 tablet 3     No facility-administered encounter medications on file as of  "1/9/2018.        Psychiatric Mental Status Exam  Appearance: unremarkable, age appropriate, NAD  Behavior/Cooperation:  appropriate friendly and cooperative  Speech: conversational rate, volume and tone  Mood: "8-9/10"  Affect:  congruent with mood and appropriate to situation/content    Thought Process: logical; linear, coherent  Thought Content:  no suicidality, no homicidality, delusions, or paranoia  Sensorium:    grossly intact  Alert and Oriented: x3  Memory: grossly intact; intact to recent and remote past  Attention/concentration: appropriate for age/education; intact to conversation  Abstract reasoning: grossly intact, able to abstract  Insight: Intact  Judgment: Intact    ASSESSMENT      Impression:    ICD-10-CM ICD-9-CM   1. Bipolar 2 disorder F31.81 296.89   2. Attention deficit hyperactivity disorder (ADHD), combined type F90.2 314.01   3. Generalized anxiety disorder F41.1 300.02       Status/Progress: Based on the examination today, the patient's problem(s) is/are well controlled.  New problems have not been presented today.   Co-morbidities, Diagnostic uncertainty and Lack of compliance are not complicating management of the primary condition.  The working differential for this patient includes ADHD vs MIKE.     TREATMENT PLAN     Medication:   - Continue Lamictal 200mg daily  - Continue Lexapro 10mg daily for MIKE    - Transition: He's moving to [place], already has apartment there, he will request records if needed, [facility] [place]    - 2017 lab reviewed and WNL except for mildly elevated cholesterol      The treatment plan and follow up plan were reviewed with the patient.  Discussed with patient informed consent, risks vs. benefits, alternative treatments, side effect profile and the inherent unpredictability of individual responses to these treatments. The patient expresses understanding of the above and displays the capacity to agree with this current plan.  Encouraged " Patient to keep future appointments.   Take medications as prescribed and abstain from substance abuse.   In the event of an emergency patient was advised to go to the emergency room.      Return to Clinic: As needed        Gay Murphy MD  Psychiatry PGY-3  [phone]

## 2018-05-18 ENCOUNTER — PATIENT MESSAGE (OUTPATIENT)
Dept: PSYCHIATRY | Facility: CLINIC | Age: 43
End: 2018-05-18

## 2018-05-18 RX ORDER — LAMOTRIGINE 200 MG/1
200 TABLET ORAL DAILY
Qty: 30 TABLET | Refills: 2 | Status: SHIPPED | OUTPATIENT
Start: 2018-05-18 | End: 2018-06-17

## 2018-05-18 RX ORDER — LAMOTRIGINE 200 MG/1
200 TABLET ORAL DAILY
Qty: 30 TABLET | Refills: 5 | Status: CANCELLED | OUTPATIENT
Start: 2018-05-18 | End: 2018-06-17

## 2018-05-18 RX ORDER — ESCITALOPRAM OXALATE 10 MG/1
10 TABLET ORAL DAILY
Qty: 30 TABLET | Refills: 2 | Status: SHIPPED | OUTPATIENT
Start: 2018-05-18 | End: 2018-06-17

## 2018-05-18 RX ORDER — ESCITALOPRAM OXALATE 10 MG/1
10 TABLET ORAL DAILY
Qty: 30 TABLET | Refills: 5 | Status: CANCELLED | OUTPATIENT
Start: 2018-05-18 | End: 2018-06-17